# Patient Record
Sex: MALE | Race: OTHER | Employment: STUDENT | URBAN - METROPOLITAN AREA
[De-identification: names, ages, dates, MRNs, and addresses within clinical notes are randomized per-mention and may not be internally consistent; named-entity substitution may affect disease eponyms.]

---

## 2018-06-20 ENCOUNTER — HOSPITAL ENCOUNTER (EMERGENCY)
Facility: HOSPITAL | Age: 28
Discharge: HOME | End: 2018-06-20
Attending: EMERGENCY MEDICINE

## 2018-06-20 VITALS
HEART RATE: 108 BPM | TEMPERATURE: 98 F | HEIGHT: 72 IN | RESPIRATION RATE: 20 BRPM | WEIGHT: 185 LBS | OXYGEN SATURATION: 100 % | SYSTOLIC BLOOD PRESSURE: 148 MMHG | BODY MASS INDEX: 25.06 KG/M2 | DIASTOLIC BLOOD PRESSURE: 87 MMHG

## 2018-06-20 DIAGNOSIS — S61.411A LACERATION OF RIGHT HAND WITHOUT FOREIGN BODY, INITIAL ENCOUNTER: Primary | ICD-10-CM

## 2018-06-20 PROCEDURE — 99283 EMERGENCY DEPT VISIT LOW MDM: CPT

## 2018-06-20 PROCEDURE — 12002 RPR S/N/AX/GEN/TRNK2.6-7.5CM: CPT

## 2018-06-20 PROCEDURE — 90471 IMMUNIZATION ADMIN: CPT

## 2018-06-20 RX ORDER — BUPIVACAINE HYDROCHLORIDE 2.5 MG/ML
10 INJECTION, SOLUTION EPIDURAL; INFILTRATION; INTRACAUDAL ONCE
Status: COMPLETED | OUTPATIENT
Start: 2018-06-20 | End: 2018-06-20

## 2018-06-21 NOTE — ED PROVIDER NOTES
Patient Seen in: [facility] Emergency Department    History   Patient presents with:  Laceration Abrasion (integumentary)    Stated Complaint: hand lac    HPI    CHIEF COMPLAINT: Right hand laceration    HISTORY OF PRESENT ILLNESS: Patient is a righ Constitutional: He is oriented to person, place, and time. He appears well-developed and well-nourished.    Musculoskeletal:        Right hand: He exhibits normal range of motion, no bony tenderness, normal two-point discrimination, normal capillary refill, Patient was screened and evaluated during this visit. I determined, within reasonable clinical confidence and prior to discharge, that an emergency medical condition was not or was no longer present.   There was no indication for further evaluation, treat

## 2018-06-21 NOTE — ED INITIAL ASSESSMENT (HPI)
Pt cut the palm of the right hand with metal.  Pt put tumeric on the wound and difficult to assess in triage. Pt denies having a recent dtap. Bleeding is controlled.

## 2018-07-02 ENCOUNTER — HOSPITAL ENCOUNTER (OUTPATIENT)
Age: 28
Discharge: HOME | End: 2018-07-02
Attending: FAMILY MEDICINE

## 2018-07-02 VITALS
BODY MASS INDEX: 23.7 KG/M2 | OXYGEN SATURATION: 100 % | HEART RATE: 88 BPM | RESPIRATION RATE: 18 BRPM | HEIGHT: 72 IN | DIASTOLIC BLOOD PRESSURE: 78 MMHG | WEIGHT: 175 LBS | SYSTOLIC BLOOD PRESSURE: 111 MMHG | TEMPERATURE: 98 F

## 2018-07-02 DIAGNOSIS — Z48.02 ENCOUNTER FOR REMOVAL OF SUTURES: Primary | ICD-10-CM

## 2018-07-03 NOTE — ED PROVIDER NOTES
Patient Seen in: [address] [facility]    History   Patient presents with:  Sut Stap RingRemoval (ingtegumentary)    Stated Complaint: RT Hand Suture Removal    HPI    72-year-old male with no significant past medical history returns 1926  ------------------------------------------------------------  Wound Examined. D/I. No s/s of infection. x5 sutures removed. Steri-Strips placed for approximation. Hand was cleaned and dressed. Pt tolerated Procedure well.     [facility]     Patient instru

## (undated) NOTE — ED AVS SNAPSHOT
Elie Tolbert   MRN: [id number]    Department:  [facility] Emergency Department   Date of Visit:  6/20/2018           Disclosure     Insurance plans vary and the physician(s) referred by the ER may not be covered by your plan.  Please contact your in tell this physician (or your personal doctor if your instructions are to return to your personal doctor) about any new or lasting problems. The primary care or specialist physician will see patients referred from the [facility] Emergency Department.  Ravi Theodore